# Patient Record
Sex: FEMALE | Race: WHITE | NOT HISPANIC OR LATINO | URBAN - METROPOLITAN AREA
[De-identification: names, ages, dates, MRNs, and addresses within clinical notes are randomized per-mention and may not be internally consistent; named-entity substitution may affect disease eponyms.]

---

## 2018-09-10 ENCOUNTER — AMBULATORY - HEALTHEAST (OUTPATIENT)
Dept: VASCULAR SURGERY | Facility: CLINIC | Age: 58
End: 2018-09-10

## 2018-09-10 DIAGNOSIS — S81.801D OPEN WOUND OF RIGHT LOWER EXTREMITY, SUBSEQUENT ENCOUNTER: ICD-10-CM

## 2018-09-18 ENCOUNTER — AMBULATORY - HEALTHEAST (OUTPATIENT)
Dept: VASCULAR SURGERY | Facility: CLINIC | Age: 58
End: 2018-09-18

## 2018-09-18 RX ORDER — LIRAGLUTIDE 6 MG/ML
1.8 INJECTION SUBCUTANEOUS
Status: SHIPPED | COMMUNITY
Start: 2017-08-28

## 2018-09-18 RX ORDER — ATENOLOL 100 MG/1
TABLET ORAL
Status: SHIPPED | COMMUNITY
Start: 2017-12-18

## 2018-09-18 RX ORDER — LOSARTAN POTASSIUM AND HYDROCHLOROTHIAZIDE 25; 100 MG/1; MG/1
TABLET ORAL
Status: SHIPPED | COMMUNITY
Start: 2014-10-08

## 2018-09-18 RX ORDER — OMEGA-3-ACID ETHYL ESTERS 1 G/1
CAPSULE, LIQUID FILLED ORAL
Status: SHIPPED | COMMUNITY
Start: 2010-04-19

## 2018-09-18 RX ORDER — GLIPIZIDE 10 MG/1
TABLET, FILM COATED, EXTENDED RELEASE ORAL
Status: SHIPPED | COMMUNITY
Start: 2018-07-02

## 2018-09-18 RX ORDER — ATORVASTATIN CALCIUM 10 MG/1
TABLET, FILM COATED ORAL
Status: SHIPPED | COMMUNITY
Start: 2018-04-03

## 2018-09-18 RX ORDER — ALBUTEROL SULFATE 90 UG/1
2 AEROSOL, METERED RESPIRATORY (INHALATION)
Status: SHIPPED | COMMUNITY
Start: 2014-12-29

## 2018-09-18 RX ORDER — CEFUROXIME AXETIL 500 MG/1
500 TABLET ORAL
Status: SHIPPED | COMMUNITY
Start: 2018-08-25

## 2018-09-18 RX ORDER — LORATADINE 10 MG/1
TABLET ORAL
Status: SHIPPED | COMMUNITY
Start: 2009-07-06

## 2018-09-18 RX ORDER — ASCORBIC ACID 500 MG
TABLET ORAL
Status: SHIPPED | COMMUNITY
Start: 2008-12-02

## 2018-09-18 RX ORDER — ASPIRIN 81 MG/1
TABLET, CHEWABLE ORAL
Status: SHIPPED | COMMUNITY
Start: 2008-09-11

## 2018-09-24 ENCOUNTER — OFFICE VISIT - HEALTHEAST (OUTPATIENT)
Dept: VASCULAR SURGERY | Facility: CLINIC | Age: 58
End: 2018-09-24

## 2018-09-24 ENCOUNTER — RECORDS - HEALTHEAST (OUTPATIENT)
Dept: ADMINISTRATIVE | Facility: OTHER | Age: 58
End: 2018-09-24

## 2018-09-24 DIAGNOSIS — E20.9 HYPOPARATHYROIDISM, UNSPECIFIED HYPOPARATHYROIDISM TYPE (H): ICD-10-CM

## 2018-09-24 DIAGNOSIS — Z79.4 ENCOUNTER FOR LONG-TERM (CURRENT) USE OF INSULIN (H): ICD-10-CM

## 2018-09-24 DIAGNOSIS — L97.912 ULCER OF RIGHT LOWER EXTREMITY WITH FAT LAYER EXPOSED (H): ICD-10-CM

## 2018-09-24 DIAGNOSIS — J45.909 ASTHMA: ICD-10-CM

## 2018-09-24 DIAGNOSIS — E66.9 OBESITY: ICD-10-CM

## 2018-09-24 DIAGNOSIS — E11.9 TYPE 2 DIABETES MELLITUS (H): ICD-10-CM

## 2018-09-24 RX ORDER — SPIRONOLACTONE 50 MG/1
50 TABLET, FILM COATED ORAL
Status: SHIPPED | COMMUNITY
Start: 2018-02-07

## 2018-09-24 ASSESSMENT — MIFFLIN-ST. JEOR: SCORE: 2034.19

## 2018-09-26 ENCOUNTER — COMMUNICATION - HEALTHEAST (OUTPATIENT)
Dept: SURGERY | Facility: CLINIC | Age: 58
End: 2018-09-26

## 2018-10-15 ENCOUNTER — OFFICE VISIT - HEALTHEAST (OUTPATIENT)
Dept: VASCULAR SURGERY | Facility: CLINIC | Age: 58
End: 2018-10-15

## 2018-10-15 DIAGNOSIS — L97.912 ULCER OF RIGHT LOWER EXTREMITY WITH FAT LAYER EXPOSED (H): ICD-10-CM

## 2018-10-15 DIAGNOSIS — E66.9 OBESITY: ICD-10-CM

## 2018-10-15 DIAGNOSIS — E20.9 HYPOPARATHYROIDISM, UNSPECIFIED HYPOPARATHYROIDISM TYPE (H): ICD-10-CM

## 2018-10-15 DIAGNOSIS — E11.9 TYPE 2 DIABETES MELLITUS (H): ICD-10-CM

## 2018-11-01 ENCOUNTER — COMMUNICATION - HEALTHEAST (OUTPATIENT)
Dept: SURGERY | Facility: CLINIC | Age: 58
End: 2018-11-01

## 2018-11-01 ENCOUNTER — COMMUNICATION - HEALTHEAST (OUTPATIENT)
Dept: TELEHEALTH | Facility: CLINIC | Age: 58
End: 2018-11-01

## 2018-11-12 ENCOUNTER — OFFICE VISIT - HEALTHEAST (OUTPATIENT)
Dept: VASCULAR SURGERY | Facility: CLINIC | Age: 58
End: 2018-11-12

## 2018-11-12 DIAGNOSIS — E11.622 TYPE 2 DIABETES MELLITUS WITH OTHER SKIN ULCER, UNSPECIFIED WHETHER LONG TERM INSULIN USE (H): ICD-10-CM

## 2018-11-12 DIAGNOSIS — E20.9 HYPOPARATHYROIDISM, UNSPECIFIED HYPOPARATHYROIDISM TYPE (H): ICD-10-CM

## 2018-11-12 DIAGNOSIS — L97.912 ULCER OF RIGHT LOWER EXTREMITY WITH FAT LAYER EXPOSED (H): ICD-10-CM

## 2018-11-12 DIAGNOSIS — L98.499 TYPE 2 DIABETES MELLITUS WITH OTHER SKIN ULCER, UNSPECIFIED WHETHER LONG TERM INSULIN USE (H): ICD-10-CM

## 2018-11-12 DIAGNOSIS — E66.01 MORBID OBESITY (H): ICD-10-CM

## 2018-11-12 RX ORDER — BLOOD-GLUCOSE CONTROL, NORMAL
1 EACH MISCELLANEOUS
Status: SHIPPED | COMMUNITY
Start: 2018-11-12

## 2018-12-03 ENCOUNTER — AMBULATORY - HEALTHEAST (OUTPATIENT)
Dept: VASCULAR SURGERY | Facility: CLINIC | Age: 58
End: 2018-12-03

## 2018-12-03 ENCOUNTER — OFFICE VISIT - HEALTHEAST (OUTPATIENT)
Dept: VASCULAR SURGERY | Facility: CLINIC | Age: 58
End: 2018-12-03

## 2018-12-03 DIAGNOSIS — L97.911 ULCER OF RIGHT LOWER EXTREMITY, LIMITED TO BREAKDOWN OF SKIN (H): ICD-10-CM

## 2018-12-03 DIAGNOSIS — Z79.4 TYPE 2 DIABETES MELLITUS WITH OTHER SKIN ULCER, WITH LONG-TERM CURRENT USE OF INSULIN (H): ICD-10-CM

## 2018-12-03 DIAGNOSIS — L98.499 TYPE 2 DIABETES MELLITUS WITH OTHER SKIN ULCER, UNSPECIFIED WHETHER LONG TERM INSULIN USE (H): ICD-10-CM

## 2018-12-03 DIAGNOSIS — E11.622 TYPE 2 DIABETES MELLITUS WITH OTHER SKIN ULCER, WITH LONG-TERM CURRENT USE OF INSULIN (H): ICD-10-CM

## 2018-12-03 DIAGNOSIS — E66.01 MORBID OBESITY (H): ICD-10-CM

## 2018-12-03 DIAGNOSIS — E11.622 TYPE 2 DIABETES MELLITUS WITH OTHER SKIN ULCER, UNSPECIFIED WHETHER LONG TERM INSULIN USE (H): ICD-10-CM

## 2018-12-20 ENCOUNTER — OFFICE VISIT - HEALTHEAST (OUTPATIENT)
Dept: VASCULAR SURGERY | Facility: CLINIC | Age: 58
End: 2018-12-20

## 2018-12-20 ENCOUNTER — RECORDS - HEALTHEAST (OUTPATIENT)
Dept: ADMINISTRATIVE | Facility: OTHER | Age: 58
End: 2018-12-20

## 2018-12-20 DIAGNOSIS — E11.622 TYPE 2 DIABETES MELLITUS WITH OTHER SKIN ULCER, UNSPECIFIED WHETHER LONG TERM INSULIN USE (H): ICD-10-CM

## 2018-12-20 DIAGNOSIS — L97.911 ULCER OF RIGHT LOWER EXTREMITY, LIMITED TO BREAKDOWN OF SKIN (H): ICD-10-CM

## 2018-12-20 DIAGNOSIS — E66.01 MORBID OBESITY (H): ICD-10-CM

## 2018-12-20 DIAGNOSIS — L98.499 TYPE 2 DIABETES MELLITUS WITH OTHER SKIN ULCER, UNSPECIFIED WHETHER LONG TERM INSULIN USE (H): ICD-10-CM

## 2018-12-20 ASSESSMENT — MIFFLIN-ST. JEOR: SCORE: 2034.19

## 2021-05-26 ENCOUNTER — RECORDS - HEALTHEAST (OUTPATIENT)
Dept: ADMINISTRATIVE | Facility: CLINIC | Age: 61
End: 2021-05-26

## 2021-06-02 VITALS — WEIGHT: 293 LBS | BODY MASS INDEX: 44.41 KG/M2 | HEIGHT: 68 IN

## 2021-06-02 VITALS — WEIGHT: 293 LBS | HEIGHT: 68 IN | BODY MASS INDEX: 44.41 KG/M2

## 2021-06-16 PROBLEM — E66.01 MORBID OBESITY (H): Status: ACTIVE | Noted: 2018-11-12

## 2021-06-16 PROBLEM — E66.9 OBESITY: Status: ACTIVE | Noted: 2018-09-24

## 2021-06-21 NOTE — PROGRESS NOTES
"Date of Service:11/12/2018    Provider's initial consult visit:  9/24/2018    Chief Complaint:   Chief Complaint   Patient presents with     Follow-up       History:   Patient presents to clinic in regards to leg ulcer as a result of a skin biopsy on 7/13/2018.  The  patient was last seen by me on 11/2/2018 when santyl was continued.  She is applying it daily and covering it with medipore.  She denies pain in the ulcer and the drainage is unchanged.      Current Outpatient Medications   Medication Sig Dispense Refill     blood glucose control, normal (CONTOUR NEXT LEV 2 CONTROL SOL) Soln Apply 1 drop topically.       multivit with minerals/lutein (MULTIVITAMIN 50 PLUS ORAL) Take by mouth.       pen needle, diabetic (ULTICARE PEN NEEDLE) 31 gauge x 5/16\" Ndle Use as directed 5 times a day with insulins       albuterol (PROAIR HFA;PROVENTIL HFA;VENTOLIN HFA) 90 mcg/actuation inhaler Inhale 2 puffs.       ascorbic acid, vitamin C, (VITAMIN C) 500 MG tablet Take by mouth.       aspirin 81 mg chewable tablet Chew.       atenolol (TENORMIN) 100 MG tablet TAKE ONE TABLET BY MOUTH EVERY DAY       atorvastatin (LIPITOR) 10 MG tablet TAKE 1 TABLET BY MOUTH DAILY AT BEDTIME.       b complex vitamins tablet Daily       calcium citrate-vitamin D3 200 mg calcium -250 unit Tab        cefuroxime (CEFTIN) 500 MG tablet Take 500 mg by mouth.       cephalexin (KEFLEX) 500 MG capsule   0     cholecalciferol, vitamin D3, 1,000 unit tablet Take 5,000 Units by mouth.       collagenase ointment Apply daily to the wound.  See instructions for application procedure. 30 g 1     fluticasone (FLOVENT HFA) 110 mcg/actuation inhaler INHALE 1 PUFF BY MOUTH TWO TIMES A DAY.       generic lancets (ACCU-CHEK MULTICLIX LANCET) three times a day. Use as directed. Pharmacy dispense brand based on insurance.       glipiZIDE (GLUCOTROL XL) 10 MG 24 hr tablet TAKE 1 TABLET BY MOUTH TWO TIMES A DAY.       insulin aspart U-100 (NOVOLOG FLEXPEN U-100 INSULIN) " 100 unit/mL injection pen Inject 20 Units under the skin.       insulin glargine (LANTUS SOLOSTAR U-100 INSULIN) 100 unit/mL (3 mL) pen INJECT 40 UNITS SUBCUTANEOUSLY TWO TIMES A DAY.       insulin lispro (HUMALOG) 100 unit/mL injection 40 units for basal plus meal time insulin 36 units, total insulin per day 76 units.       levothyroxine (SYNTHROID, LEVOTHROID) 200 MCG tablet Take 200 mcg by mouth.       liraglutide (VICTOZA 2-ILENE) 0.6 mg/0.1 mL (18 mg/3 mL) PnIj injection Inject 1.8 mg under the skin.       loratadine (CLARITIN) 10 mg tablet Take by mouth.       losartan-hydrochlorothiazide (HYZAAR) 100-25 mg per tablet Take by mouth.       magnesium oxide 200 mg magnesium tablet daily with breakfast. Indications: will clarify dose in future       metFORMIN (GLUCOPHAGE) 1000 MG tablet TAKE 1 TABLET BY MOUTH TWO TIMES A DAY.       omega-3 acid ethyl esters (LOVAZA) 1 gram capsule Take by mouth.       omeprazole (PRILOSEC) 20 MG capsule TAKE 1 CAPSULE BY MOUTH DAILY 1 HOUR BEFORE BREAKFAST.       spironolactone (ALDACTONE) 50 MG tablet Take 50 mg by mouth.       vit B comp-C-FA-iron-vit E 500 mg-400 mcg- 18 mg iron Tab Daily       No current facility-administered medications for this visit.        Allergies   Allergen Reactions     Erythromycin Unknown     rash     Lisinopril Cough and Other (See Comments)     Leg cramps     Amoxicillin Rash       Physical Exam:    Vitals:    11/12/18 1520   BP: 112/80   Pulse: 72   Temp: 97.9  F (36.6  C)    There is no height or weight on file to calculate BMI.    General:  58 y.o. female in no apparent distress.    Head:  Normocephalic atraumatic  Psychiatric: Cooperative.   Respiratory: unlabored breathing.  Cardiovascular-Lower extremity: Edema: minimal;  Pulses Using a handheld doppler, the bilateral  dorsalis pedis and posterior tibial pulses are strong, dampened and unrestrictive and biphasic in nature, 9/24/2018.    Vasc Edema 9/24/2018 11/12/2018   Right just above MTP  26.5 26.3   Right Ankle 33.5 31.5   Right Widest Calf 49 47.9   Right Thigh Up 10cm 55 -   Left - just above MTP 27 26.1   Left Ankle 35 30.4   Left Widest Calf 48 46.5   Left Thigh Up 10cm 57.8 -       Integumentary:      Right Leg Ulceration(s):   Wound bed: Prior to debridement: 100% adherent slough Undermining no, Tunneling no   Wound Edge:attached   Periwound:  There is  marley wound erythema, but no warmth  induration, maceration, denudement or fluctuance surrounding the ulcer(s).  Exudate: minimal  serous drainage with no odor.  Wound Shape regular     VASC Wound 09/24/18 right shin (Active)   Pre Size Length 1 11/12/2018  3:00 PM   Pre Size Width 0.8 11/12/2018  3:00 PM   Pre Size Depth 0.1 11/12/2018  3:00 PM   Pre Total Sq cm 0.8 11/12/2018  3:00 PM   Post Size Length 0.8 11/12/2018  3:00 PM   Post Size Width 1 11/12/2018  3:00 PM   Post Size Depth 0.1 10/15/2018  2:00 PM   Undermined n 11/12/2018  3:00 PM   Tunneling n 11/12/2018  3:00 PM       Photo:       Assessment:    Images:  none pertinent    Labs:    The following labs were reviewed by me today.    No results found for: WBC, HGB, HCT, MCV, PLT            Invalid input(s): EOSPC    No results found for: CREATININE      No results found for: BUN  No results found for: PREALBUMINESUFAST(LABPROT,LABALBU,albumin)@  Invalid input(s): LABALBU  No results found for: PREALBUMIN    No results found for: CRP  No results found for: SEDRATE    No results found for: HGBA1C  No results found for: TSH        No results found for: [id number]  No results found for: BNP     1. Ulcer of right lower extremity with fat layer exposed (H)     2. Hypoparathyroidism, unspecified hypoparathyroidism type (H)     3. Type 2 diabetes mellitus with other skin ulcer, unspecified whether long term insulin use (H)     4. Morbid obesity (H)         A new wound was identified today: no.    Plan:  1.  Excisional Debridement of the right leg ulcer was recommended and after consent was  obtained and topical 2% Xylocaine was applied, under clean conditions, using a #15 scalpel, the devitalized subcutaneous  tissue in the ulcer base and at the ulcer margins were sharply excised for a total sq. cm of 0.8.  Following excision, there was minimal bleeding tissue, which was easily controlled and a decrease in the nonviable tissue.  The patient tolerated the procedure without difficulty.     2.  Right leg ulcer, decreasing in size, but slowly.    3.  Edema. Multifactorial, will start continue with Tubigrip for compression.     4.   Obesity.  We had a discussion about obesity and its contribution to her leg edema and diabetes.  I strongly encouraged weight loss and recommended the Medical Bariat[facility] Clinic at [facility].   Appointment pending.     5.   Diabetes,  Managed by Endocrinology.     6.  Treatment: Wound treatment will include irrigation and dressings to promote autolytic debridement.   Will continue with santyl daily and cover with medipore + pad.  Will use a double layer of Tubigrip for compression.     7.   Patient will follow up with me in three weeks  for reevaluation, if she is able to obtain insurance authorization.      Michelle Fraga, APRN, CNP,  CWCN  [facility]  [phone]

## 2021-06-22 NOTE — PROGRESS NOTES
Date of Service:12/3/2018    Provider's initial consult visit:  9/24/2018    Chief Complaint:   Chief Complaint   Patient presents with     Wound Check       History:   Patient presents to clinic in regards to leg ulcer as a result of a skin biopsy on 7/13/2018.  The  patient was last seen by me on 11/12/2018 when santyl was continued.  She is applying it daily and covering it with medipore.  She denies pain in the ulcer and the drainage is unchanged.      Current Outpatient Medications   Medication Sig Dispense Refill     albuterol (PROAIR HFA;PROVENTIL HFA;VENTOLIN HFA) 90 mcg/actuation inhaler Inhale 2 puffs.       ascorbic acid, vitamin C, (VITAMIN C) 500 MG tablet Take by mouth.       aspirin 81 mg chewable tablet Chew.       atenolol (TENORMIN) 100 MG tablet TAKE ONE TABLET BY MOUTH EVERY DAY       atorvastatin (LIPITOR) 10 MG tablet TAKE 1 TABLET BY MOUTH DAILY AT BEDTIME.       b complex vitamins tablet Daily       blood glucose control, normal (CONTOUR NEXT LEV 2 CONTROL SOL) Soln Apply 1 drop topically.       calcium citrate-vitamin D3 200 mg calcium -250 unit Tab        cefuroxime (CEFTIN) 500 MG tablet Take 500 mg by mouth.       cephalexin (KEFLEX) 500 MG capsule   0     cholecalciferol, vitamin D3, 1,000 unit tablet Take 5,000 Units by mouth.       collagenase ointment Apply daily to the wound.  See instructions for application procedure. 30 g 1     CONTOUR NEXT TEST STRIPS strips   10     fluticasone (FLOVENT HFA) 110 mcg/actuation inhaler INHALE 1 PUFF BY MOUTH TWO TIMES A DAY.       generic lancets (ACCU-CHEK MULTICLIX LANCET) three times a day. Use as directed. Pharmacy dispense brand based on insurance.       glipiZIDE (GLUCOTROL XL) 10 MG 24 hr tablet TAKE 1 TABLET BY MOUTH TWO TIMES A DAY.       insulin aspart U-100 (NOVOLOG FLEXPEN U-100 INSULIN) 100 unit/mL injection pen Inject 20 Units under the skin.       insulin glargine (LANTUS SOLOSTAR U-100 INSULIN) 100 unit/mL (3 mL) pen INJECT 40  "UNITS SUBCUTANEOUSLY TWO TIMES A DAY.       insulin lispro (HUMALOG) 100 unit/mL injection 40 units for basal plus meal time insulin 36 units, total insulin per day 76 units.       levothyroxine (SYNTHROID, LEVOTHROID) 200 MCG tablet Take 200 mcg by mouth.       liraglutide (VICTOZA 2-ILENE) 0.6 mg/0.1 mL (18 mg/3 mL) PnIj injection Inject 1.8 mg under the skin.       loratadine (CLARITIN) 10 mg tablet Take by mouth.       losartan-hydrochlorothiazide (HYZAAR) 100-25 mg per tablet Take by mouth.       magnesium oxide 200 mg magnesium tablet daily with breakfast. Indications: will clarify dose in future       metFORMIN (GLUCOPHAGE) 1000 MG tablet TAKE 1 TABLET BY MOUTH TWO TIMES A DAY.       multivit with minerals/lutein (MULTIVITAMIN 50 PLUS ORAL) Take by mouth.       omega-3 acid ethyl esters (LOVAZA) 1 gram capsule Take by mouth.       omeprazole (PRILOSEC) 20 MG capsule TAKE 1 CAPSULE BY MOUTH DAILY 1 HOUR BEFORE BREAKFAST.       pen needle, diabetic (ULTICARE PEN NEEDLE) 31 gauge x 5/16\" Ndle Use as directed 5 times a day with insulins       spironolactone (ALDACTONE) 50 MG tablet Take 50 mg by mouth.       vit B comp-C-FA-iron-vit E 500 mg-400 mcg- 18 mg iron Tab Daily       Current Facility-Administered Medications   Medication Dose Route Frequency Provider Last Rate Last Dose     lidocaine 2 % jelly (XYLOCAINE)   Topical PRN Michelle Fraga, CNP   1 application at 12/03/18 1149       Allergies   Allergen Reactions     Erythromycin Unknown     rash     Lisinopril Cough and Other (See Comments)     Leg cramps     Amoxicillin Rash       Physical Exam:    Vitals:    12/03/18 1149   BP: 138/78   Pulse: 80   Resp: 18   Temp: 98  F (36.7  C)    There is no height or weight on file to calculate BMI.    General:  58 y.o. female in no apparent distress.    Head:  Normocephalic atraumatic  Psychiatric: Cooperative.   Respiratory: unlabored breathing.  Cardiovascular-Lower extremity: Edema: minimal;  Pulses Using a " handheld doppler, the bilateral  dorsalis pedis and posterior tibial pulses are strong, dampened and unrestrictive and biphasic in nature, 9/24/2018.    Vasc Edema 9/24/2018 11/12/2018   Right just above MTP 26.5 26.3   Right Ankle 33.5 31.5   Right Widest Calf 49 47.9   Right Thigh Up 10cm 55 -   Left - just above MTP 27 26.1   Left Ankle 35 30.4   Left Widest Calf 48 46.5   Left Thigh Up 10cm 57.8 -       Integumentary:      Right Leg Ulceration(s):   Wound bed: Prior to debridement: 100% adherent slough Undermining no, Tunneling no   Wound Edge:attached   Periwound:  There is  marley wound erythema, but no warmth  induration, maceration, denudement or fluctuance surrounding the ulcer(s).  Exudate: minimal  serous drainage with no odor.  Wound Shape regular     VASC Wound 09/24/18 right shin (Active)   Pre Size Length 0.9 12/3/2018 11:00 AM   Pre Size Width 0.8 12/3/2018 11:00 AM   Pre Size Depth 0.1 12/3/2018 11:00 AM   Pre Total Sq cm 0.72 12/3/2018 11:00 AM   Post Size Length 0.8 11/12/2018  3:00 PM   Post Size Width 0.5 12/3/2018 11:00 AM   Post Size Depth 0.4 12/3/2018 11:00 AM   Undermined n 11/12/2018  3:00 PM   Tunneling n 11/12/2018  3:00 PM       Photo: none available       Assessment:    Images:  none pertinent    Labs:    The following labs were reviewed by me today.    No results found for: WBC, HGB, HCT, MCV, PLT            Invalid input(s): EOSPC    No results found for: CREATININE      No results found for: BUN  No results found for: PREALBUMINESUFAST(LABPROT,LABALBU,albumin)@  Invalid input(s): LABALBU  No results found for: PREALBUMIN    No results found for: CRP  No results found for: SEDRATE    No results found for: HGBA1C  No results found for: TSH        No results found for: [id number]  No results found for: BNP     1. Ulcer of right lower extremity, limited to breakdown of skin (H)     2. Type 2 diabetes mellitus with other skin ulcer, unspecified whether long term insulin use (H)     3.  Morbid obesity (H)     4. Type 2 diabetes mellitus with other skin ulcer, with long-term current use of insulin (H)         A new wound was identified today: no.    Plan:  1.  Debridement of the right leg ulcer was recommended.  After consent was obtained and topical 2% Xylocaine was applied under clean conditions, and using a #15 blade,the devitalized dermal tissue was debrided for a total square centimeters of 0.72. Following debridement, there was a decrease in the nonviable tissue. The patient tolerated the procedure without any difficulty.   .   2.  Right leg ulcer, improved    3.  Edema. improved     4.   Obesity.  We had a discussion about obesity and its contribution to her leg edema and diabetes.  I strongly encouraged weight loss and recommended the Medical Bariatric Clinic at [facility].   Appointment pending.     5.   Diabetes,  Managed by Endocrinology.     6.  Treatment: Wound treatment will include irrigation and dressings to promote autolytic debridement.   Will continue with santyl daily and cover with medipore + pad.  Will use a double layer of Tubigrip for compression.     7.   Patient will follow up with me in three weeks  for reevaluation, if she is able to obtain insurance authorization.      Michelle Fraga, APRN, CNP,  CWCN  [facility]  [phone]

## 2021-06-22 NOTE — PROGRESS NOTES
Date of Service:12/20/2018    Provider's initial consult visit:  9/24/2018    Chief Complaint:   Chief Complaint   Patient presents with     Follow-up       History:   Patient presents to clinic in regards to leg ulcer as a result of a skin biopsy on 7/13/2018.  The  patient was last seen by me on 12/3/2018 when santyl was continued.  She is applying it daily and covering it with medipore.  Unfortunately, she developed several skin tears as a result of the tape on the bandage.  She denies any pain in her ulcers.  To the new ulcers, she is applying Vaseline and covering with a gauze.there is minimal drainage and no pain.    Current Outpatient Medications   Medication Sig Dispense Refill     albuterol (PROAIR HFA;PROVENTIL HFA;VENTOLIN HFA) 90 mcg/actuation inhaler Inhale 2 puffs.       ascorbic acid, vitamin C, (VITAMIN C) 500 MG tablet Take by mouth.       aspirin 81 mg chewable tablet Chew.       atenolol (TENORMIN) 100 MG tablet TAKE ONE TABLET BY MOUTH EVERY DAY       atorvastatin (LIPITOR) 10 MG tablet TAKE 1 TABLET BY MOUTH DAILY AT BEDTIME.       b complex vitamins tablet Daily       blood glucose control, normal (CONTOUR NEXT LEV 2 CONTROL SOL) Soln Apply 1 drop topically.       calcium citrate-vitamin D3 200 mg calcium -250 unit Tab        cefuroxime (CEFTIN) 500 MG tablet Take 500 mg by mouth.       cephalexin (KEFLEX) 500 MG capsule   0     cholecalciferol, vitamin D3, 1,000 unit tablet Take 5,000 Units by mouth.       collagenase ointment Apply daily to the wound.  See instructions for application procedure. 30 g 1     CONTOUR NEXT TEST STRIPS strips   10     fluticasone (FLOVENT HFA) 110 mcg/actuation inhaler INHALE 1 PUFF BY MOUTH TWO TIMES A DAY.       generic lancets (ACCU-CHEK MULTICLIX LANCET) three times a day. Use as directed. Pharmacy dispense brand based on insurance.       glipiZIDE (GLUCOTROL XL) 10 MG 24 hr tablet TAKE 1 TABLET BY MOUTH TWO TIMES A DAY.       insulin aspart U-100 (NOVOLOG  "FLEXPEN U-100 INSULIN) 100 unit/mL injection pen Inject 20 Units under the skin.       insulin glargine (LANTUS SOLOSTAR U-100 INSULIN) 100 unit/mL (3 mL) pen INJECT 40 UNITS SUBCUTANEOUSLY TWO TIMES A DAY.       insulin lispro (HUMALOG) 100 unit/mL injection 40 units for basal plus meal time insulin 36 units, total insulin per day 76 units.       levothyroxine (SYNTHROID, LEVOTHROID) 200 MCG tablet Take 200 mcg by mouth.       liraglutide (VICTOZA 2-ILENE) 0.6 mg/0.1 mL (18 mg/3 mL) PnIj injection Inject 1.8 mg under the skin.       loratadine (CLARITIN) 10 mg tablet Take by mouth.       losartan-hydrochlorothiazide (HYZAAR) 100-25 mg per tablet Take by mouth.       magnesium oxide 200 mg magnesium tablet daily with breakfast. Indications: will clarify dose in future       metFORMIN (GLUCOPHAGE) 1000 MG tablet TAKE 1 TABLET BY MOUTH TWO TIMES A DAY.       multivit with minerals/lutein (MULTIVITAMIN 50 PLUS ORAL) Take by mouth.       omega-3 acid ethyl esters (LOVAZA) 1 gram capsule Take by mouth.       omeprazole (PRILOSEC) 20 MG capsule TAKE 1 CAPSULE BY MOUTH DAILY 1 HOUR BEFORE BREAKFAST.       pen needle, diabetic (ULTICARE PEN NEEDLE) 31 gauge x 5/16\" Ndle Use as directed 5 times a day with insulins       spironolactone (ALDACTONE) 50 MG tablet Take 50 mg by mouth.       vit B comp-C-FA-iron-vit E 500 mg-400 mcg- 18 mg iron Tab Daily       Current Facility-Administered Medications   Medication Dose Route Frequency Provider Last Rate Last Dose     lidocaine 2 % jelly (XYLOCAINE)   Topical PRN Michelle Fraga, CNP   1 application at 12/03/18 1149       Allergies   Allergen Reactions     Erythromycin Unknown     rash     Lisinopril Cough and Other (See Comments)     Leg cramps     Amoxicillin Rash       Physical Exam:    Vitals:    12/20/18 1124   BP: 130/78   Pulse: 68   Resp: 16   Temp: 98.8  F (37.1  C)    Body mass index is 47.29 kg/m .    General:  58 y.o. female in no apparent distress.    Head:  " Normocephalic atraumatic  Psychiatric: Cooperative.   Respiratory: unlabored breathing.  Cardiovascular-Lower extremity: Edema: minimal;  Pulses Using a handheld doppler, the bilateral  dorsalis pedis and posterior tibial pulses are strong, dampened and unrestrictive and biphasic in nature, 9/24/2018.    Vasc Edema 9/24/2018 11/12/2018 12/20/2018   Right just above MTP 26.5 26.3 25   Right Ankle 33.5 31.5 31.5   Right Widest Calf 49 47.9 48.6   Right Thigh Up 10cm 55 - 52   Left - just above MTP 27 26.1 26   Left Ankle 35 30.4 32   Left Widest Calf 48 46.5 47.5   Left Thigh Up 10cm 57.8 - 59.2       Integumentary:      Right Leg Ulceration(s):   Wound bed: Prior to debridement: 100% granulation.  Undermining no, Tunneling no   Wound Edge:attached   Periwound:  There is  marley wound erythema, but no warmth  induration, maceration, denudement or fluctuance surrounding the ulcer(s).  Exudate: minimal  serous drainage with no odor.  Wound Shape:   irregular    VASC Wound 09/24/18 right shin (Active)   Pre Size Length 0.9 12/3/2018 11:00 AM   Pre Size Width 0.8 12/3/2018 11:00 AM   Pre Size Depth 0.1 12/3/2018 11:00 AM   Pre Total Sq cm 0.72 12/3/2018 11:00 AM   Post Size Length 0.8 11/12/2018  3:00 PM   Post Size Width 0.5 12/3/2018 11:00 AM   Post Size Depth 0.4 12/3/2018 11:00 AM   Undermined n 11/12/2018  3:00 PM   Tunneling n 11/12/2018  3:00 PM       VASC Wound 12/20/18 anterior lateral right shin (Active)   Pre Size Length 2.5 12/20/2018 11:00 AM   Pre Size Width 0.8 12/20/2018 11:00 AM   Pre Size Depth 0.1 12/20/2018 11:00 AM   Pre Total Sq cm 2 12/20/2018 11:00 AM   Undermined n 12/20/2018 11:00 AM   Tunneling no 12/20/2018 11:00 AM       VASC Wound 12/20/18 superior anterior right shin (Active)   Pre Size Length 1.1 12/20/2018 11:00 AM   Pre Size Width 0.3 12/20/2018 11:00 AM   Pre Size Depth 0.1 12/20/2018 11:00 AM   Pre Total Sq cm 0.33 12/20/2018 11:00 AM   Undermined no 12/20/2018 11:00 AM   Tunneling no  12/20/2018 11:00 AM   Description red wound bed 12/20/2018 11:00 AM       VASC Wound 12/20/18 inferior anterior right shin (Active)   Pre Size Length 1 12/20/2018 11:00 AM   Pre Size Width 0.5 12/20/2018 11:00 AM   Pre Size Depth 0.1 12/20/2018 11:00 AM   Pre Total Sq cm 0.5 12/20/2018 11:00 AM   Undermined none 12/20/2018 11:00 AM   Tunneling none 12/20/2018 11:00 AM   Description red, wound bed 12/20/2018 11:00 AM       Photo: none available       Assessment:    Images:  none pertinent    Labs:    The following labs were reviewed by me today.    No results found for: WBC, HGB, HCT, MCV, PLT            Invalid input(s): EOSPC    No results found for: CREATININE      No results found for: BUN  No results found for: PREALBUMINESUFAST(LABPROT,LABALBU,albumin)@  Invalid input(s): LABALBU  No results found for: PREALBUMIN    No results found for: CRP  No results found for: SEDRATE    No results found for: HGBA1C  No results found for: TSH        No results found for: [id number]  No results found for: BNP     1. Ulcer of right lower extremity, limited to breakdown of skin (H)     2. Type 2 diabetes mellitus with other skin ulcer, unspecified whether long term insulin use (H)     3. Morbid obesity (H)         A new wound was identified today: yes, right leg    Plan:  1.  Debridement of the right leg ulcer was recommended.  After consent was obtained and topical 2% Xylocaine was applied under clean conditions, and using a #15 blade,the devitalized dermal tissue was debrided for a total square centimeters of 2.0. Following debridement, there was a decrease in the nonviable tissue. The patient tolerated the procedure without any difficulty.   .   2.  Right leg ulcer, original ulcer is resolved.  No signs of infection in the     3.  Edema. improved     4.   Diabetes,  Managed by Endocrinology.     6.  Treatment: Wound treatment will include irrigation and dressings to promote autolytic debridement.   Will discontinue with  santyl daily and no bandage is needed.  To the new ulcers, will apply layers of viscopaste over the open areas and cover with an ABD.  She will wear her compression stockings for compression.     7.   Patient will follow up with me in three weeks  for reevaluation, if she is able to obtain insurance authorization.      Michelle Fraga, APRN, CNP,  [facility]  [phone]

## 2021-06-26 NOTE — PROGRESS NOTES
Progress Notes by Michelle Fraga CNP at 10/15/2018  2:00 PM     Author: Michelle Fraga CNP Service: -- Author Type: Nurse Practitioner    Filed: 10/15/2018  3:22 PM Encounter Date: 10/15/2018 Status: Signed    : Michelle Fraga CNP (Nurse Practitioner)       Date of Service:10/15/2018    Provider's initial consult visit:  9/24/2018    Chief Complaint:   Chief Complaint   Patient presents with   ? Wound Check     3 wk f/u right leg ulcer, Santyl, medipore + pad       History:   Patient presents to clinic in regards to leg ulcer as a result of a skin biopsy on 7/13/2018.  The  patient was last seen by me on 9/24/2018 when santyl was started.  She is applying it daily and covering it with medipore.  She denies pain in the ulcer and the drainage is unchanged.      Current Outpatient Prescriptions   Medication Sig Dispense Refill   ? albuterol (PROAIR HFA;PROVENTIL HFA;VENTOLIN HFA) 90 mcg/actuation inhaler Inhale 2 puffs.     ? ascorbic acid, vitamin C, (VITAMIN C) 500 MG tablet Take by mouth.     ? aspirin 81 mg chewable tablet Chew.     ? atenolol (TENORMIN) 100 MG tablet TAKE ONE TABLET BY MOUTH EVERY DAY     ? atorvastatin (LIPITOR) 10 MG tablet TAKE 1 TABLET BY MOUTH DAILY AT BEDTIME.     ? b complex vitamins tablet Daily     ? cefuroxime (CEFTIN) 500 MG tablet Take 500 mg by mouth.     ? cephalexin (KEFLEX) 500 MG capsule   0   ? cholecalciferol, vitamin D3, 1,000 unit tablet Take 5,000 Units by mouth.     ? collagenase ointment Apply daily to the wound.  See instructions for application procedure. 30 g 1   ? fluticasone (FLOVENT HFA) 110 mcg/actuation inhaler INHALE 1 PUFF BY MOUTH TWO TIMES A DAY.     ? generic lancets (ACCU-CHEK MULTICLIX LANCET) three times a day. Use as directed. Pharmacy dispense brand based on insurance.     ? glipiZIDE (GLUCOTROL XL) 10 MG 24 hr tablet TAKE 1 TABLET BY MOUTH TWO TIMES A DAY.     ? insulin aspart U-100 (NOVOLOG FLEXPEN U-100 INSULIN) 100 unit/mL  injection pen Inject 20 Units under the skin.     ? insulin glargine (LANTUS SOLOSTAR U-100 INSULIN) 100 unit/mL (3 mL) pen INJECT 40 UNITS SUBCUTANEOUSLY TWO TIMES A DAY.     ? insulin lispro (HUMALOG) 100 unit/mL injection 40 units for basal plus meal time insulin 36 units, total insulin per day 76 units.     ? levothyroxine (SYNTHROID, LEVOTHROID) 200 MCG tablet Take 200 mcg by mouth.     ? liraglutide (VICTOZA 2-ILENE) 0.6 mg/0.1 mL (18 mg/3 mL) PnIj injection Inject 1.8 mg under the skin.     ? loratadine (CLARITIN) 10 mg tablet Take by mouth.     ? losartan-hydrochlorothiazide (HYZAAR) 100-25 mg per tablet Take by mouth.     ? magnesium oxide 200 mg magnesium tablet daily with breakfast. Indications: will clarify dose in future     ? metFORMIN (GLUCOPHAGE) 1000 MG tablet TAKE 1 TABLET BY MOUTH TWO TIMES A DAY.     ? omega-3 acid ethyl esters (LOVAZA) 1 gram capsule Take by mouth.     ? omeprazole (PRILOSEC) 20 MG capsule TAKE 1 CAPSULE BY MOUTH DAILY 1 HOUR BEFORE BREAKFAST.     ? spironolactone (ALDACTONE) 50 MG tablet Take 50 mg by mouth.       No current facility-administered medications for this visit.        Allergies   Allergen Reactions   ? Erythromycin Unknown     rash   ? Lisinopril Cough and Other (See Comments)     Leg cramps   ? Amoxicillin Rash       Physical Exam:    Vitals:    10/15/18 1402   BP: 120/78   Pulse: 80   Resp: 20   Temp: 98.3  F (36.8  C)    There is no height or weight on file to calculate BMI.    General:  57 y.o. female in no apparent distress.    Head:  Normocephalic atraumatic  Psychiatric: Cooperative.   Respiratory: unlabored breathing.  Cardiovascular-Lower extremity: Edema: minimal;  Pulses Using a handheld doppler, the bilateral  dorsalis pedis and posterior tibial pulses are strong, dampened and unrestrictive and biphasic in nature, 9/24/2018.    Vasc Edema 9/24/2018   Right just above MTP 26.5   Right Ankle 33.5   Right Widest Calf 49   Right Thigh Up 10cm 55   Left - just  above MTP 27   Left Ankle 35   Left Widest Calf 48   Left Thigh Up 10cm 57.8       Integumentary:      Right Leg Ulceration(s):   Wound bed: Prior to debridement: 100% adherent slough Undermining no, Tunneling no   Wound Edge:attached   Periwound:  There is  marley wound erythema, but no warmth  induration, maceration, denudement or fluctuance surrounding the ulcer(s).  Exudate: minimal  serous drainage with no odor.  Wound Shape regular           VASC Wound 09/24/18 right shin (Active)   Pre Size Length 1.5 10/15/2018  2:00 PM   Pre Size Width 1 10/15/2018  2:00 PM   Pre Size Depth 0.1 10/15/2018  2:00 PM   Pre Total Sq cm 1.5 10/15/2018  2:00 PM   Post Size Length 1 10/15/2018  2:00 PM   Post Size Width 0.8 10/15/2018  2:00 PM   Post Size Depth 0.1 10/15/2018  2:00 PM       Photo:       Assessment:    Images:  none pertinent    Labs:    The following labs were reviewed by me today.    No results found for: WBC, HGB, HCT, MCV, PLT            Invalid input(s): EOSPC    No results found for: CREATININE      No results found for: BUN  No results found for: PREALBUMINESUFAST(LABPROT,LABALBU,albumin)@  Invalid input(s): LABALBU  No results found for: PREALBUMIN    No results found for: CRP  No results found for: SEDRATE    No results found for: HGBA1C  No results found for: TSH        No results found for: LUFRCGHL10DC  No results found for: BNP     1. Ulcer of right lower extremity with fat layer exposed (H)  Change dressing   2. Hypoparathyroidism, unspecified hypoparathyroidism type (H)     3. Type 2 diabetes mellitus (H)     4. Obesity         A new wound was identified today: no.    Plan:  1.  Excisional Debridement of the right leg ulcer was recommended and after consent was obtained and topical 2% Xylocaine was applied, under clean conditions, using a #15 scalpel, the devitalized subcutaneous  tissue in the ulcer base and at the ulcer margins were sharply excised for a total sq. cm of 1.5.  Following excision,  there was minimal bleeding tissue, which was easily controlled and a decrease in the nonviable tissue.  The patient tolerated the procedure without difficulty.     2.  Right leg ulcer, decreasing in size.    3.  Edema. Multifactorial, will start continue with Tubigrip for compression.     4.   Obesity.  We had a discussion about obesity and its contribution to her leg edema and diabetes.  I strongly encouraged weight loss and recommended the Medical Bariatric Clinic at [facility].   Appointment scheduled for 11/1/2018.     5.   Diabetes,  Managed by Endocrinology.     6.  Treatment: Wound treatment will include irrigation and dressings to promote autolytic debridement.   Will continue with santyl daily and cover with medipore + pad.  Will use a double layer of Tubigrip for compression.     7.   Patient will follow up with me in three weeks  for reevaluation.      Michelle Fraga, APRN, CNP,  CWCN  [facility]  [phone]

## 2021-06-26 NOTE — PROGRESS NOTES
Progress Notes by Michelle Fraga CNP at 9/24/2018  9:40 AM     Author: Michelle Fraga CNP Service: -- Author Type: Nurse Practitioner    Filed: 9/24/2018 11:06 AM Encounter Date: 9/24/2018 Status: Signed    : Michelle Fraga CNP (Nurse Practitioner)       [facility] Consult Note    Date of Service:  9/24/2018    Requesting Provider: Elaina Gtz MD    History of Present Illness:     Elaina Gtz MD referred Nevaeh Terry for her leg ulcer.   The patient presents to clinic with her .   Pt had a shave skin biopsy done 7/13/18 on right anterior shin for workup of a rash.  The ulcer failed to heal.  She had a different ulceration over posterior right thigh which became infected in July and was treated with a course of Keflex. The infection recurred and she was then treated with a 5 day course of Bactrim, but the ulcer continues to not improve.  She is applying Bacitracin over the wound and surrounding skin.  She is not wearing any compression.     Review of Symptoms:    GI: Appetite is good,   Taking Nutritional supplements: No  Weight:  no change      Cardiovascular:  denies Chest pain or discomfort;         Edema: There is moderate  edema noted in bilateral LE.      Respiratory:  denies unusual shortness of breath    Bowels: No concerns     : No concerns.     MSK: Patient is ambulatory; does not use an assistive device :      Neurological:   reports numbness and tingling in feet bilaterally.  Does not take medication for it.    Endocrine: is not diabetic,  is diabetic for 2006 years.  A1c: unsure and Diabetic management by Endocrinologist.     All other systems were reviewed are not pertinent to the presenting problem.    Past Medical History:    Past Medical History:   Diagnosis Date   ? Adrenal mass (H)    ? Anemia    ? Asthma    ? Depression    ? Dysmenorrhea    ? Encounter for long-term (current) use of insulin (H)    ? Esophageal reflux    ?  Hyperlipidemia    ? Hypertension    ? Hypertriglyceridemia    ? Hypoparathyroidism, unspecified hypoparathyroidism type (H)    ? Ingrown nail    ? Menorrhagia    ? Myopia    ? Thyroiditis    ? Type 2 diabetes mellitus (H)         Surgical History:   Past Surgical History:   Procedure Laterality Date   ? HERNIA REPAIR      abd.   ? HYSTERECTOMY  10/27/2010   ? HYSTEROSCOPY  08/2009    done for excessive menstration   ? LAPAROSCOPIC CHOLECYSTECTOMY W/ CHOLANGIOGRAPHY  11/2002    open CHOLECYSTECTOMY   ? NEUROPLASTY / TRANSPOSITION MEDIAN NERVE AT CARPAL TUNNEL Right 12/1999       Allergies:   Allergies   Allergen Reactions   ? Erythromycin Unknown     rash   ? Lisinopril Cough and Other (See Comments)     Leg cramps   ? Amoxicillin Rash          Medications:    Current Outpatient Prescriptions:   ?  albuterol (PROAIR HFA;PROVENTIL HFA;VENTOLIN HFA) 90 mcg/actuation inhaler, Inhale 2 puffs., Disp: , Rfl:   ?  ascorbic acid, vitamin C, (VITAMIN C) 500 MG tablet, Take by mouth., Disp: , Rfl:   ?  aspirin 81 mg chewable tablet, Chew., Disp: , Rfl:   ?  atenolol (TENORMIN) 100 MG tablet, TAKE ONE TABLET BY MOUTH EVERY DAY, Disp: , Rfl:   ?  atorvastatin (LIPITOR) 10 MG tablet, TAKE 1 TABLET BY MOUTH DAILY AT BEDTIME., Disp: , Rfl:   ?  b complex vitamins tablet, Daily, Disp: , Rfl:   ?  cholecalciferol, vitamin D3, 1,000 unit tablet, Take 5,000 Units by mouth., Disp: , Rfl:   ?  generic lancets (ACCU-CHEK MULTICLIX LANCET), three times a day. Use as directed. Pharmacy dispense brand based on insurance., Disp: , Rfl:   ?  glipiZIDE (GLUCOTROL XL) 10 MG 24 hr tablet, TAKE 1 TABLET BY MOUTH TWO TIMES A DAY., Disp: , Rfl:   ?  insulin aspart U-100 (NOVOLOG FLEXPEN U-100 INSULIN) 100 unit/mL injection pen, Inject 20 Units under the skin., Disp: , Rfl:   ?  insulin glargine (LANTUS SOLOSTAR U-100 INSULIN) 100 unit/mL (3 mL) pen, INJECT 40 UNITS SUBCUTANEOUSLY TWO TIMES A DAY., Disp: , Rfl:   ?  insulin lispro (HUMALOG) 100  unit/mL injection, 40 units for basal plus meal time insulin 36 units, total insulin per day 76 units., Disp: , Rfl:   ?  levothyroxine (SYNTHROID, LEVOTHROID) 200 MCG tablet, Take 200 mcg by mouth., Disp: , Rfl:   ?  liraglutide (VICTOZA 2-ILENE) 0.6 mg/0.1 mL (18 mg/3 mL) PnIj injection, Inject 1.8 mg under the skin., Disp: , Rfl:   ?  loratadine (CLARITIN) 10 mg tablet, Take by mouth., Disp: , Rfl:   ?  losartan-hydrochlorothiazide (HYZAAR) 100-25 mg per tablet, Take by mouth., Disp: , Rfl:   ?  magnesium oxide 200 mg magnesium tablet, daily with breakfast. Indications: will clarify dose in future, Disp: , Rfl:   ?  metFORMIN (GLUCOPHAGE) 1000 MG tablet, TAKE 1 TABLET BY MOUTH TWO TIMES A DAY., Disp: , Rfl:   ?  omega-3 acid ethyl esters (LOVAZA) 1 gram capsule, Take by mouth., Disp: , Rfl:   ?  omeprazole (PRILOSEC) 20 MG capsule, TAKE 1 CAPSULE BY MOUTH DAILY 1 HOUR BEFORE BREAKFAST., Disp: , Rfl:   ?  spironolactone (ALDACTONE) 50 MG tablet, Take 50 mg by mouth., Disp: , Rfl:   ?  cefuroxime (CEFTIN) 500 MG tablet, Take 500 mg by mouth., Disp: , Rfl:   ?  collagenase ointment, Apply daily to the wound.  See instructions for application procedure., Disp: 30 g, Rfl: 1  ?  fluticasone (FLOVENT HFA) 110 mcg/actuation inhaler, INHALE 1 PUFF BY MOUTH TWO TIMES A DAY., Disp: , Rfl:     Family history:   Family History   Problem Relation Age of Onset   ? Cerebral aneurysm Mother    ? Heart attack Father    ? Ovarian cancer Maternal Aunt    ? Ovarian cancer Paternal Aunt    ? Cataracts Maternal Grandmother    ? Glaucoma Maternal Grandmother    ? Breast cancer Maternal Grandmother    ? Heart attack Maternal Grandfather          Social History:   Social History     Social History   ? Marital status:      Spouse name: Suresh   ? Number of children: 0   ? Years of education: N/A     Occupational History   ? 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 Smoking status: Never Smoker  "  ? Smokeless tobacco: Never Used   ? Alcohol use Yes      Comment: rarely    ? Drug use: No   ? Sexual activity: Not on file     Other Topics Concern   ? Not on file     Social History Narrative    Lives with . 9/24/2018            Physical Exam    General: This is a 57 y.o. female who appears their reported age, not in acute distress    Constitution:  Vital Signs: /72  Pulse 84  Temp 98.7  F (37.1  C)  Resp 20  Ht 5' 8\" (1.727 m)  Wt (!) 311 lb (141.1 kg)  BMI 47.29 kg/m2 Body mass index is 47.29 kg/(m^2).    Psychiatric: Alert and oriented X 3, in no apparent distress. Calm and cooperative throughout exam    Head/Neck:  Normocephalic, atraumatic    Cardiovascular:  Rate and Rhythm is regular    Respiratory: clear to auscultation, no wheezes, rales or rhonchi, symmetric air entry    Abdomen:  Normal bowel sounds. Soft, symmetric, no guarding or rigidity, non tender with palpation.  No organomegaly or masses palpated.     Integumentary/Hair/Nails:  Turgor and texture are normal.  Nails are normal.    MSK:  Bilateral No ROM deficit in foot/feet    Groin Lymphatics: No inguinal lymphadenopathy:    Neurological:  Grossly intact. Lower extremity sensation:  Using a monofilament the patient was able to identify 5/7 sites on the right plantar foot and 6/7 sites on the left plantar foot.    Vascular:    Arterial:    Femoral:  strong and equal bilaterally.   Using a handheld doppler, the bilateral  dorsalis pedis and posterior tibial pulses are strong, dampened and unrestrictive and biphasic in nature.    bilateral toes there is less than a 3 second capillary refill. Hair growth on feet is present bilaterally          Venous:  There is venous distention on theLeft leg(s).  There is telangiectasias and no physical signs of venous insufficiency on the Bilateral lower extremity    There is moderate pitting edema noted in bilateral LE.  Stemmers sign negative.      Circumferential volume measures:    Vasc " Edema 9/24/2018   Right just above MTP 26.5   Right Ankle 33.5   Right Widest Calf 49   Right Thigh Up 10cm 55   Left - just above MTP 27   Left Ankle 35   Left Widest Calf 48   Left Thigh Up 10cm 57.8       Ulceration(s)/Wound(s):     Right Leg Ulceration(s):     Wound bed: %100 adherent slough          Undermining no, Tunneling no   Wound Edge: attached   Periwound:  There is no marley wound erythema, induration,  fluctuance or warmth surrounding the ulcer(s).  There is maceration and denudement.  Exudate: minimal yellow    Odor:  absent   Wound Shape irregular    VASC Wound 09/24/18 right shin (Active)   Pre Size Length 4.2 9/24/2018  9:00 AM   Pre Size Width 3.1 9/24/2018  9:00 AM   Pre Size Depth 0.1 9/24/2018  9:00 AM   Pre Total Sq cm 13.02 9/24/2018  9:00 AM   Post Size Length 3.8 9/24/2018  9:00 AM   Post Size Width 3 9/24/2018  9:00 AM       Photo       Laboratory studies:     No results found for: WBC, HGB, HCT, MCV, PLT  No results found for: CREATININE  No results found for: BUN  No results found for: CRP  No results found for: SEDRATE  No results found for: LABPROT, ALBUMIN  No results found for: HGBA1C    No results found for: TSH      No results found for: BNP  No results found for this or any previous visit.  No results found for: [id number]    Imaging:  None pertinent     Assessment:  1. Ulcer of right lower extremity with fat layer exposed (H)  Change dressing    collagenase ointment    DISCONTINUED: collagenase ointment   2. Type 2 diabetes mellitus (H)     3. Hypoparathyroidism, unspecified hypoparathyroidism type (H)         Assessment/Plan:  1.  Excisional Debridement of the right leg ulcer was recommended and after consent was obtained and topical 2% Xylocaine was applied, under clean conditions, using a #15 scalpel, the devitalized subcutaneous  tissue in the ulcer base and at the ulcer margins were sharply excised for a total sq. cm of 13.02.  Following excision, there was minimal bleeding  tissue, which was easily controlled and a decrease in the nonviable tissue.  The patient tolerated the procedure without difficulty.     2.  Right leg ulcer secondary to shave biopsy.  No signs of infection.  Suspect the periwound maceration and denudement is secondary to the bacitracin.    3.  Edema. Multifactorial, will start Tubigrip for compression.    4.   Obesity.  We had a discussion about obesity and its contribution to her leg edema and diabetes.  I strongly encouraged weight loss and recommended the Medical Bariatric Clinic at [facility].  A referral was made to the non-surgical clinic.    5.  Diabetes, poorly controlled, but recently has improved management.  Managed by Endocrinology.    6.  Treatment: Wound treatment will include irrigation and dressings to promote autolytic debridement.   Will start santyl daily and cover with medipore + pad.  Will use a double layer of Tubigrip for compression.    7.   Patient will follow up with me in three weeks  for reevaluation.   Instructions were given to call the clinic sooner with any further questions or concerns. Answered all questions.      Michelle Fraga, APRN, CNP, CWCN  [facility]  [phone]